# Patient Record
Sex: MALE | Race: WHITE
[De-identification: names, ages, dates, MRNs, and addresses within clinical notes are randomized per-mention and may not be internally consistent; named-entity substitution may affect disease eponyms.]

---

## 2019-07-22 ENCOUNTER — HOSPITAL ENCOUNTER (EMERGENCY)
Dept: HOSPITAL 60 - LB.ED | Age: 79
Discharge: HOME | End: 2019-07-22
Payer: MEDICARE

## 2019-07-22 DIAGNOSIS — Z79.899: ICD-10-CM

## 2019-07-22 DIAGNOSIS — I25.10: ICD-10-CM

## 2019-07-22 DIAGNOSIS — Z79.82: ICD-10-CM

## 2019-07-22 DIAGNOSIS — K80.20: Primary | ICD-10-CM

## 2019-07-22 PROCEDURE — 96361 HYDRATE IV INFUSION ADD-ON: CPT

## 2019-07-22 PROCEDURE — 99284 EMERGENCY DEPT VISIT MOD MDM: CPT

## 2019-07-22 PROCEDURE — 74176 CT ABD & PELVIS W/O CONTRAST: CPT

## 2019-07-22 PROCEDURE — 83690 ASSAY OF LIPASE: CPT

## 2019-07-22 PROCEDURE — 81001 URINALYSIS AUTO W/SCOPE: CPT

## 2019-07-22 PROCEDURE — 96374 THER/PROPH/DIAG INJ IV PUSH: CPT

## 2019-07-22 PROCEDURE — 85025 COMPLETE CBC W/AUTO DIFF WBC: CPT

## 2019-07-22 PROCEDURE — 36415 COLL VENOUS BLD VENIPUNCTURE: CPT

## 2019-07-22 PROCEDURE — 80053 COMPREHEN METABOLIC PANEL: CPT

## 2019-07-22 NOTE — EDM.PDOC
ED HPI GENERAL MEDICAL PROBLEM





- General


Chief Complaint: Abdominal Pain


Stated Complaint: RIGHT ABDOMINAL PAIN


Time Seen by Provider: 07/22/19 19:35


Source of Information: Reports: Patient, Family, RN


History Limitations: Reports: No Limitations





- History of Present Illness


INITIAL COMMENTS - FREE TEXT/NARRATIVE: 


This 79 yr male presents with right upper abdominal pain, started tonight 

around 1730.  Reports have [employer] for lunch and was walking dog this 

afternoon when pain started.  Rates pain 8/10.  States history of small 

gallstone in the past.  Hx of CAD and hernia in past.  States he does have 

loose stools and this isn't new for him and no diagnosis with this.  Wife 

states he takes Metoprolol and Crestor daily and Cilium husk to aide in BM.  





  ** Right Upper Anterior Abdomen


Pain Score (Numeric/FACES): 5





- Related Data


 Allergies











Allergy/AdvReac Type Severity Reaction Status Date / Time


 


No Known Allergies Allergy   Verified 12/09/13 09:16











Home Meds: 


 Home Meds





Ascorbate Calcium [Vitamin C] 500 mg PO 12/09/13 [History]


Aspirin [Adult Low Dose Aspirin EC] 81 mg PO DAILY 12/09/13 [History]


Metoprolol Succinate 12.5 mg PO BID 12/09/13 [History]


Nitroglycerin [Nitrostat] 0.3 mg SL PRN 12/09/13 [History]


Rosuvastatin [Crestor] 10 mg PO DAILY 12/09/13 [History]











ED ROS GENERAL





- Review of Systems


Review Of Systems: See Below


Constitutional: Reports: Decreased Appetite.  Denies: Fever, Chills


HEENT: Reports: No Symptoms


Respiratory: Denies: Shortness of Breath, Cough


Cardiovascular: Denies: Chest Pain, Edema, Lightheadedness


Endocrine: Reports: No Symptoms


GI/Abdominal: Reports: Abdominal Pain, Decreased Appetite, Other (loose stools)

.  Denies: Nausea, Vomiting


: Reports: No Symptoms


Musculoskeletal: Denies: Neck Pain, Back Pain


Skin: Reports: No Symptoms


Neurological: Reports: No Symptoms


Psychiatric: Reports: No Symptoms


Hematologic/Lymphatic: Reports: No Symptoms


Immunologic: Reports: No Symptoms





ED EXAM, GI/ABD





- Physical Exam


Exam: See Below


Exam Limited By: No Limitations


General Appearance: Alert, No Apparent Distress


Ears: Hearing Grossly Normal


Nose: Normal Inspection


Throat/Mouth: Normal Inspection, Normal Voice, No Airway Compromise


Head: Atraumatic, Normocephalic


Neck: Normal Inspection, Supple, Full Range of Motion


Respiratory/Chest: No Respiratory Distress, Lungs Clear, Normal Breath Sounds, 

Chest Non-Tender


Cardiovascular: Regular Rate, Rhythm, No Edema


GI/Abdominal Exam: Normal Bowel Sounds, Soft, No Distention, Other (pain to RUQ 

of abdomin).  No: Guarding, Rigid


 (Male) Exam: Deferred


Rectal (Males) Exam: Deferred


Back Exam: Normal Inspection, Full Range of Motion


Extremities: Normal Inspection, Normal Range of Motion, Non-Tender, No Pedal 

Edema


Neurological: Alert, Oriented, Normal Cognition


Psychiatric: Normal Affect, Normal Mood


Skin Exam: Warm, Dry, Normal Color


Lymphatic: No Adenopathy





Course





- Vital Signs


Last Recorded V/S: 


 Last Vital Signs











Temp  98.4 F   07/22/19 22:17


 


Pulse  80   07/22/19 22:17


 


Resp  17   07/22/19 22:17


 


BP  155/91 H  07/22/19 22:17


 


Pulse Ox  98   07/22/19 22:17














- Orders/Labs/Meds


Labs: 


 Laboratory Tests











  07/22/19 07/22/19 07/22/19 Range/Units





  19:35 19:35 19:36 


 


WBC  8.9    (4.0-11.0)  K/uL


 


RBC  5.19    (4.50-6.50)  M/uL


 


Hgb  15.8    (13.0-18.0)  g/dL


 


Hct  45.3    (40.0-54.0)  %


 


MCV  87    (76-96)  fL


 


MCH  30.4    (27.0-32.0)  pg


 


MCHC  34.9    (31.0-35.0)  g/dL


 


RDW  12.9    (11.0-16.0)  %


 


Plt Count  202    (150-400)  K/uL


 


MPV  9.5    (6.0-10.0)  fL


 


Neut % (Auto)  69.5    (45.0-70.0)  %


 


Lymph % (Auto)  20.4    (20.0-40.0)  %


 


Mono % (Auto)  7.5    (3.0-10.0)  %


 


Eos % (Auto)  2.2    (1.0-5.0)  %


 


Baso % (Auto)  0.4    (0.0-0.5)  %


 


Neut # (Auto)  6.17    (2.00-7.50)  K/uL


 


Lymph # (Auto)  1.81    (1.50-4.00)  K/uL


 


Mono # (Auto)  0.67    (0.20-0.80)  K/uL


 


Eos # (Auto)  0.20    (0.04-0.40)  K/uL


 


Baso # (Auto)  0.04    (0.02-0.10)  K/uL


 


Sodium     (136-145)  mmol/L


 


Potassium     (3.5-5.1)  mmol/L


 


Chloride     ()  mmol/L


 


Carbon Dioxide     (21.0-32.0)  mmol/L


 


Anion Gap     (5.0-15.0)  mmol/L


 


BUN     (8-26)  mg/dL


 


Creatinine     (0.70-1.30)  mg/dL


 


Est Cr Clr Drug Dosing     mL/min


 


Estimated GFR (MDRD)     (>60)  MLS/MIN


 


BUN/Creatinine Ratio     (6-25)  


 


Glucose     ()  mg/dL


 


Calcium     (8.5-10.1)  mg/dL


 


Total Bilirubin     (0.0-1.0)  mg/dL


 


AST     (15-37)  U/L


 


ALT     (12-78)  U/L


 


Alkaline Phosphatase     ()  U/L


 


Total Protein     (6.4-8.2)  g/dL


 


Albumin     (3.4-5.0)  g/dL


 


Globulin     (2.2-4.2)  g/dL


 


Albumin/Globulin Ratio     (0.8-2.0)  


 


Lipase   267  D   ()  U/L


 


Urine Color    Yellow  


 


Urine Appearance    Clear  (CLEAR)  


 


Urine pH    5.5  (5.0-8.0)  


 


Ur Specific Gravity    1.025  (1.003-1.030)  


 


Urine Protein    Negative  (NEGATIVE)  mg/dL


 


Urine Glucose (UA)    Negative  (NEGATIVE)  mg/dL


 


Urine Ketones    Trace H  (NEGATIVE)  mg/dL


 


Urine Occult Blood    Small H  (NEGATIVE)  


 


Urine Nitrite    Negative  (NEGATIVE)  


 


Urine Bilirubin    Negative  (NEGATIVE)  


 


Urine Urobilinogen    0.2  (0.2-1.0)  E.U./dL


 


Ur Leukocyte Esterase    Negative  (NEGATIVE)  


 


Urine RBC    0-5 H  /HPF


 


Urine WBC    0-5 H  /HPF














  07/22/19 Range/Units





  19:36 


 


WBC   (4.0-11.0)  K/uL


 


RBC   (4.50-6.50)  M/uL


 


Hgb   (13.0-18.0)  g/dL


 


Hct   (40.0-54.0)  %


 


MCV   (76-96)  fL


 


MCH   (27.0-32.0)  pg


 


MCHC   (31.0-35.0)  g/dL


 


RDW   (11.0-16.0)  %


 


Plt Count   (150-400)  K/uL


 


MPV   (6.0-10.0)  fL


 


Neut % (Auto)   (45.0-70.0)  %


 


Lymph % (Auto)   (20.0-40.0)  %


 


Mono % (Auto)   (3.0-10.0)  %


 


Eos % (Auto)   (1.0-5.0)  %


 


Baso % (Auto)   (0.0-0.5)  %


 


Neut # (Auto)   (2.00-7.50)  K/uL


 


Lymph # (Auto)   (1.50-4.00)  K/uL


 


Mono # (Auto)   (0.20-0.80)  K/uL


 


Eos # (Auto)   (0.04-0.40)  K/uL


 


Baso # (Auto)   (0.02-0.10)  K/uL


 


Sodium  143  (136-145)  mmol/L


 


Potassium  4.3  (3.5-5.1)  mmol/L


 


Chloride  106  ()  mmol/L


 


Carbon Dioxide  27.1  (21.0-32.0)  mmol/L


 


Anion Gap  14.2  (5.0-15.0)  mmol/L


 


BUN  26  (8-26)  mg/dL


 


Creatinine  1.01  (0.70-1.30)  mg/dL


 


Est Cr Clr Drug Dosing  70.08  mL/min


 


Estimated GFR (MDRD)  > 60  (>60)  MLS/MIN


 


BUN/Creatinine Ratio  25.7 H  (6-25)  


 


Glucose  105 H  ()  mg/dL


 


Calcium  8.6  (8.5-10.1)  mg/dL


 


Total Bilirubin  1.6 H  (0.0-1.0)  mg/dL


 


AST  91 H  (15-37)  U/L


 


ALT  63  (12-78)  U/L


 


Alkaline Phosphatase  78  ()  U/L


 


Total Protein  6.7  (6.4-8.2)  g/dL


 


Albumin  3.7  (3.4-5.0)  g/dL


 


Globulin  3.0  (2.2-4.2)  g/dL


 


Albumin/Globulin Ratio  1.2  (0.8-2.0)  


 


Lipase   ()  U/L


 


Urine Color   


 


Urine Appearance   (CLEAR)  


 


Urine pH   (5.0-8.0)  


 


Ur Specific Gravity   (1.003-1.030)  


 


Urine Protein   (NEGATIVE)  mg/dL


 


Urine Glucose (UA)   (NEGATIVE)  mg/dL


 


Urine Ketones   (NEGATIVE)  mg/dL


 


Urine Occult Blood   (NEGATIVE)  


 


Urine Nitrite   (NEGATIVE)  


 


Urine Bilirubin   (NEGATIVE)  


 


Urine Urobilinogen   (0.2-1.0)  E.U./dL


 


Ur Leukocyte Esterase   (NEGATIVE)  


 


Urine RBC   /HPF


 


Urine WBC   /HPF











Meds: 


Medications














Discontinued Medications














Generic Name Dose Route Start Last Admin





  Trade Name Freq  PRN Reason Stop Dose Admin


 


Hydrocodone Bitart/Acetaminophen  10 tab  07/22/19 19:30  





  Norco 325-5 Mg  .ROUTE  07/22/19 19:31  





  .STK-MED ONE   





     





     





     





     


 


Hydromorphone HCl  Confirm  07/22/19 20:40  07/22/19 20:49





  Dilaudid  Administered  07/22/19 20:41  Not Given





  Dose   





  2 mg   





  .ROUTE   





  .STK-MED ONE   





     





     





     





     


 


Hydromorphone HCl  0.5 mg  07/22/19 20:46  07/22/19 20:49





  Dilaudid  IVPUSH  07/22/19 20:47  0.5 mg





  ONETIME ONE   Administration





     





     





     





     


 


Sodium Chloride  1,000 mls @ 125 mls/hr  07/22/19 19:45  07/22/19 20:47





  Normal Saline  IV   125 mls/hr





  ASDIRECTED ABDIEL   Administration





     





     





     





     


 


Sodium Chloride  10 ml  07/22/19 19:39  





  Saline Flush  FLUSH   





  SEECOMMENT PRN   





  Keep Vein Open   





     





     





     














- Re-Assessments/Exams


Free Text/Narrative Re-Assessment/Exam: 





07/22/19 20:45


CMP, CBC, U/A and CT of abdomen completed with gallstone noted to gallbladder.  

Notified pt and family of gallstone.  Dilaudid 0.5 mg IV ordered, Nacl at 125cc/

hr ordered.  Will discharge to home if pain improved and taking oral fluids 

with consult with general surgery.











Departure





- Departure


Time of Disposition: 22:20


Disposition: Home, Self-Care 01


Condition: Fair


Clinical Impression: 


 Cholelithiasis, Abdominal pain








- Discharge Information


*PRESCRIPTION DRUG MONITORING PROGRAM REVIEWED*: Not Applicable


*COPY OF PRESCRIPTION DRUG MONITORING REPORT IN PATIENT LENY: Not Applicable


Instructions:  Cholelithiasis, Easy-to-Read


Referrals: 


PCP,None [Primary Care Provider] - 


Forms:  ED Department Discharge, ED Return to Work/School Form


Additional Instructions: 


Up as tolerated.  Limit stairs, frequent bending and limit strenuous activity.  

Follow a low fat diet until you meet with the [facility].  [facility] 

[facility] department will contact you in the next 2 days.  If they do not 

please contact them at [phone]. Please use the Hydrocodone 1 tab every 6 

hours as needed for pain, take food with this medication.  Ibuprofen 600mg may 

be taken as needed every 8 hours in addition to Hydrocodone.  Please take food 

with Ibuprofen also. Zofran prescription is sent with.  This medication will 

help with nausea. 





- Assessment/Plan


Plan: 


Cholelithiasis:


Discharge to home with assist of family.  Hydrocodone/APAP 5/325mg PO every 6 

hour prn pain, may use Ibuprofen 600 mg PO every 8 hour as needed, take 

medication with food.  Rx for Zofran 4mg PO prn given to be filled in am.  Will 

set up consult for general surgery at [facility].  Pt should hear from [facility] for 

appointment.  Activity restrictions as needed to prevent pain, low fat diet as 

tolerated.  Continue medications as at home.  RTC or ER if symptoms worsen.

## 2019-07-23 NOTE — CT
DATE OF SERVICE:  07/22/19

CLINICAL DATA:  RIGHT ABDOMINAL PAIN.



UNENHANCED ABDOMEN AND PELVIC CT: 



Multislice acquisition through the abdomen and pelvis without IV or oral 
contrast was performed.  Axial images and sagittal and coronal reformations are 
reviewed. 



Comparison is made to a prior enhanced abdomen and pelvic CT dated 12/23/13.



There are mild atelectatic changes in the dependent portion of both lungs and 
in both lung bases.  The heart size is normal.  There are moderate coronary 
artery calcifications. 



There is a large hiatal hernia containing most of the stomach.  



The unenhanced liver appears normal.  No focal hepatic lesions.  There is a 
densely calcified 13 mm gallstone noted within the gallbladder.  No gallbladder 
wall thickening.  No pericholecystic fluid.  



The spleen appears normal.  The pancreas appears normal.  The right and left 
adrenals appear normal.  



There is a 16 mm fluid density lesion projecting from the upper pole of the 
left kidney consistent with a renal cyst.  There are small parapelvic cysts 
bilaterally.  The kidneys otherwise appear normal.  No nephrocalcinosis or 
nephrolithiasis.  No hydronephrosis or hydroureter.  



The bladder is partially fluid filled.  It appears normal. 



The prostate is enlarged.  There are calcification within the prostate 
consistent with chronic prostatitis.  



No evidence of appendicitis.  



There is mild diverticulosis of the descending and sigmoid colon.  No evidence 
of diverticulitis.  



There is a ventral hernia on the left lateral to the rectus abdominis muscle.  
It does contain a loop of colon.  No evidence of obstruction associated with 
it.  



There is a right inguinal hernia containing a loop of small bowel.  No evidence 
of obstruction associated with it.  There is a fat-containing left inguinal 
hernia. 



No free air.  No free fluid.  No dilated loops of bowel.  No adenopathy.  No 
aortic aneurysm.  



There is degenerative disk disease throughout the lower thoracic and lumbar 
spine.  



IMPRESSION:  Cholelithiasis.  Multiple other findings as discussed above.  No 
acute abnormalities. 



[id number]
[facility]

## 2019-08-06 ENCOUNTER — HOSPITAL ENCOUNTER (EMERGENCY)
Dept: HOSPITAL 60 - LB.ED | Age: 79
LOS: 1 days | Discharge: HOME | End: 2019-08-07
Payer: MEDICARE

## 2019-08-06 DIAGNOSIS — R33.9: Primary | ICD-10-CM

## 2019-08-06 DIAGNOSIS — Z79.82: ICD-10-CM

## 2019-08-06 DIAGNOSIS — Z79.899: ICD-10-CM

## 2019-08-06 PROCEDURE — 99283 EMERGENCY DEPT VISIT LOW MDM: CPT

## 2019-08-06 PROCEDURE — 51798 US URINE CAPACITY MEASURE: CPT

## 2019-08-06 PROCEDURE — 51702 INSERT TEMP BLADDER CATH: CPT

## 2019-08-07 NOTE — EDM.PDOC
ED HPI GENERAL MEDICAL PROBLEM





- General


Chief Complaint: Genitourinary Problem


Stated Complaint: CANNOT PEE


Time Seen by Provider: 08/07/19 00:05


Source of Information: Reports: Patient


History Limitations: Reports: No Limitations





- History of Present Illness


INITIAL COMMENTS - FREE TEXT/NARRATIVE: 


79 yr male presents with retention of urination.  Pt had laproscopic surgery 

Monday and was discharged Monday night and has been unable to void Tuesday.  He 

hasn't had a BM yet.  He has some Hydrocodone/APAP at home.  States his last 

dose was around 1pm today.  States no history of prostate problems in the past, 

but does have some constipation history. He had been having a low fat diet.








- Related Data


 Allergies











Allergy/AdvReac Type Severity Reaction Status Date / Time


 


No Known Allergies Allergy   Verified 08/07/19 00:22











Home Meds: 


 Home Meds





Ascorbate Calcium [Vitamin C] 500 mg PO 12/09/13 [History]


Aspirin [Adult Low Dose Aspirin EC] 81 mg PO DAILY 12/09/13 [History]


Metoprolol Succinate 12.5 mg PO BID 12/09/13 [History]


Nitroglycerin [Nitrostat] 0.3 mg SL PRN 12/09/13 [History]


Rosuvastatin [Crestor] 10 mg PO DAILY 12/09/13 [History]











Past Medical History





- Past Health History


Medical/Surgical History: Denies Medical/Surgical History


Cardiovascular History: Reports: Stents





ED ROS GENERAL





- Review of Systems


Review Of Systems: See Below


Constitutional: Reports: No Symptoms


HEENT: Reports: Glasses


Respiratory: Reports: No Symptoms


Cardiovascular: Reports: No Symptoms


GI/Abdominal: Reports: Other (abdominal pain is rated at "8" with coughing, but 

none now.).  Denies: Diarrhea, Decreased Appetite


: Reports: Urinary Retention.  Denies: Dysuria, Hematuria


Skin: Reports: Other (dressings intact to abdomen)


Neurological: Reports: No Symptoms


Psychiatric: Reports: No Symptoms





ED EXAM, RENAL/





- Physical Exam


Exam: See Below


Exam Limited By: No Limitations


General Appearance: Alert, No Apparent Distress


Ears: Hearing Grossly Normal


Throat/Mouth: Normal Voice, No Airway Compromise


Head: Atraumatic, Normocephalic


Neck: Normal Inspection, Supple, Non-Tender


Respiratory/Chest: No Respiratory Distress, Lungs Clear, Normal Breath Sounds


Cardiovascular: Regular Rate, Rhythm, No Edema


GI/Abdominal: Soft, Non-Tender, No Distention


Extremities: Normal Inspection, Normal Range of Motion, Non-Tender, No Pedal 

Edema


Neurological: Alert, Oriented


Psychiatric: Normal Affect, Normal Mood


Skin Exam: Warm, Dry, Intact, Normal Color





Course





- Re-Assessments/Exams


Free Text/Narrative Re-Assessment/Exam: 





08/07/19 00:41 Pt tolerated urinary catheter without problems. Clear emperatriz 

urine noted.  Flomax 0.4 mg PO given.  Will discharge to care of family.  

Instructions on use of pain medicine, cough drops or lozenge for sore throat 

and warm salt water gargle as needed.








Departure





- Departure


Time of Disposition: 00:44


Disposition: Home, Self-Care 01


Condition: Good


Clinical Impression: 


 Retention of urine








- Discharge Information


*PRESCRIPTION DRUG MONITORING PROGRAM REVIEWED*: Not Applicable


*COPY OF PRESCRIPTION DRUG MONITORING REPORT IN PATIENT LENY: Not Applicable





- Assessment/Plan


Plan: 


Discharge to family care.  Continue with post op instructions.  Continue with 

pain medicine as needed.  Start Senna S to prevent constipation.  Continue with 

diet as tolerated.  Return to clinic in afternoon if unable to void.

## 2020-07-28 ENCOUNTER — HOSPITAL ENCOUNTER (EMERGENCY)
Dept: HOSPITAL 60 - LB.ED | Age: 80
Discharge: HOME | End: 2020-07-28
Payer: MEDICARE

## 2020-07-28 DIAGNOSIS — Z95.5: ICD-10-CM

## 2020-07-28 DIAGNOSIS — Z79.82: ICD-10-CM

## 2020-07-28 DIAGNOSIS — Z79.899: ICD-10-CM

## 2020-07-28 DIAGNOSIS — K40.90: Primary | ICD-10-CM

## 2020-07-28 PROCEDURE — 80053 COMPREHEN METABOLIC PANEL: CPT

## 2020-07-28 PROCEDURE — 99284 EMERGENCY DEPT VISIT MOD MDM: CPT

## 2020-07-28 PROCEDURE — 85025 COMPLETE CBC W/AUTO DIFF WBC: CPT

## 2020-07-28 PROCEDURE — 74177 CT ABD & PELVIS W/CONTRAST: CPT

## 2020-07-28 PROCEDURE — 36415 COLL VENOUS BLD VENIPUNCTURE: CPT

## 2020-07-28 PROCEDURE — 86140 C-REACTIVE PROTEIN: CPT

## 2020-07-28 PROCEDURE — 96374 THER/PROPH/DIAG INJ IV PUSH: CPT

## 2020-07-28 RX ADMIN — SODIUM CHLORIDE ONE ML: 9 INJECTION, SOLUTION INTRAMUSCULAR; INTRAVENOUS; SUBCUTANEOUS at 12:36

## 2020-07-28 RX ADMIN — SODIUM CHLORIDE ONE ML: 9 INJECTION, SOLUTION INTRAMUSCULAR; INTRAVENOUS; SUBCUTANEOUS at 12:29

## 2020-07-28 NOTE — EDM.PDOC
ED HPI GENERAL MEDICAL PROBLEM





- General


Chief Complaint: Abdominal Pain


Stated Complaint: ABD PAIN/POSSIBLE BOWEL OBSTRUCTION


Time Seen by Provider: 07/28/20 11:05


Source of Information: Reports: Patient, RN, RN Notes Reviewed


History Limitations: Reports: No Limitations





- History of Present Illness


INITIAL COMMENTS - FREE TEXT/NARRATIVE: 





Patient reports acute onset RLQ pain since last PM patient describes as dull, 

aching, and non-radiating.


Onset: Other (yesterday)


Onset Date: 07/27/20


Onset Time: 20:00


Duration: Day(s): (1)


Location: Reports: Abdomen (RLQ)


Front/Back Body Image: 


                            __________________________














                            __________________________





 1 - pain





Quality: Reports: Ache


Severity: Moderate


Improves with: Reports: None


Worsens with: Reports: None


Associated Symptoms: Denies: Confusion, Chest Pain, Cough, Fever/Chills, 

Headaches, Loss of Appetite, Malaise, Nausea/Vomiting, Rash, Shortness of 

Breath, Syncope, Weakness


  ** Right Lower Abdominal


Pain Score (Numeric/FACES): 6





- Related Data


                                    Allergies











Allergy/AdvReac Type Severity Reaction Status Date / Time


 


No Known Allergies Allergy   Verified 07/28/20 10:59











Home Meds: 


                                    Home Meds





Aspirin [Adult Low Dose Aspirin EC] 81 mg PO DAILY 12/09/13 [History]


Metoprolol Succinate 12.5 mg PO BID 12/09/13 [History]


Nitroglycerin [Nitrostat] 0.3 mg SL ASDIRECTED PRN 12/09/13 [History]


Rosuvastatin [Crestor] 10 mg PO DAILY 12/09/13 [History]











Past Medical History





- Past Health History


Medical/Surgical History: Denies Medical/Surgical History


Cardiovascular History: Reports: Stents





ED ROS GENERAL





- Review of Systems


Review Of Systems: See Below


Constitutional: Reports: No Symptoms


HEENT: Reports: No Symptoms


Respiratory: Reports: No Symptoms


Cardiovascular: Reports: No Symptoms


Endocrine: Reports: No Symptoms


GI/Abdominal: Reports: Abdominal Pain (RLQ).  Denies: Anorexia, Black Stool, 

Bloody Stool, Constipation, Diarrhea, Decreased Appetite, Difficulty Swallowing,

 Distension, Flatus, Hematemesis, Hematochezia, Melena, Mucous in Stool, Nausea,

 Stool Incontinence, Vomiting


Musculoskeletal: Reports: No Symptoms


Skin: Reports: No Symptoms


Neurological: Reports: No Symptoms





ED EXAM, GENERAL





- Physical Exam


Exam: See Below


Exam Limited By: No Limitations


General Appearance: Alert, WD/WN, No Apparent Distress


Ears: Normal External Exam, Normal Canal, Hearing Grossly Normal, Normal TMs


Nose: Normal Inspection, Normal Mucosa, No Blood


Throat/Mouth: Normal Inspection, Normal Lips, Normal Teeth, Normal Gums, Normal 

Oropharynx, Normal Voice, No Airway Compromise


Head: Atraumatic, Normocephalic


Neck: Normal Inspection, Supple, Non-Tender, Full Range of Motion


Respiratory/Chest: No Respiratory Distress, Lungs Clear, Normal Breath Sounds, 

No Accessory Muscle Use, Chest Non-Tender


Cardiovascular: Normal Peripheral Pulses, Regular Rate, Rhythm, No Edema, No 

Gallop, No JVD, No Murmur, No Rub


GI/Abdominal: Normal Bowel Sounds, Soft, No Organomegaly, No Distention, No 

Abnormal Bruit, No Mass, Pelvis Stable, Tender (RLQ).  No: Guarding, Rigid, 

Rebound, Abnormal Bowel Sounds, Mass, Hepatomegaly, Splenomegaly


Back Exam: Normal Inspection, Full Range of Motion.  No: CVA Tenderness (R), CVA

 Tenderness (L)


Extremities: Normal Inspection, Normal Range of Motion, Non-Tender, Normal 

Capillary Refill, No Pedal Edema


Neurological: Alert, Oriented, CN II-XII Intact, Normal Cognition, Normal Gait, 

Normal Reflexes, No Motor/Sensory Deficits


Skin Exam: Warm, Dry, Intact, Normal Color, No Rash


Lymphatic: No Adenopathy





Course





- Vital Signs


Last Recorded V/S: 


                                Last Vital Signs











Temp  98.3 F   07/28/20 13:04


 


Pulse  85   07/28/20 13:04


 


Resp  16   07/28/20 13:04


 


BP  159/86 H  07/28/20 13:04


 


Pulse Ox  98   07/28/20 13:04














- Orders/Labs/Meds


Orders: 


                               Active Orders 24 hr











 Category Date Time Status


 


 Abdomen Pelvis w Cont [CT] Stat Exams  07/28/20 11:18 Taken


 


 Diatrizoate Naya/Diatrizoate Na [Gastrografin 37%] Med  07/28/20 12:00 Active





 30 ml PO ASDIRECTED   


 


 Iodixanol [Visipaque 320] Med  07/28/20 13:00 Active





 100 ml IV ASDIRECTED   


 


 Sodium Chloride 0.9% [Saline Flush] Med  07/28/20 11:16 Active





 10 ml FLUSH ASDIRECTED PRN   


 


 Peripheral IV Insertion Adult [OM.PC] Routine Oth  07/28/20 11:16 Ordered








                                Medication Orders





Diatrizoate Meglum/Diatrizoate Sod (Gastrografin 37%)  30 ml PO ASDIRECTED ABDIEL


   Stop: 07/28/20 23:59


Iodixanol (Visipaque 320)  100 ml IV ASDIRECTED ABDIEL


   Stop: 07/28/20 23:59


Sodium Chloride (Saline Flush)  10 ml FLUSH ASDIRECTED PRN


   PRN Reason: Keep Vein Open


   Last Admin: 07/28/20 12:29  Dose: 10 ml


   Documented by: LEI








Labs: 


                                Laboratory Tests











  07/28/20 07/28/20 Range/Units





  11:17 11:17 


 


WBC  8.5   (4.0-11.0)  K/uL


 


RBC  5.32   (4.50-6.50)  M/uL


 


Hgb  16.6   (13.0-18.0)  g/dL


 


Hct  46.5   (40.0-54.0)  %


 


MCV  87   (76-96)  fL


 


MCH  31.2   (27.0-32.0)  pg


 


MCHC  35.7 H   (31.0-35.0)  g/dL


 


RDW  12.7   (11.0-16.0)  %


 


Plt Count  214   (150-400)  K/uL


 


MPV  9.6   (6.0-10.0)  fL


 


Neut % (Auto)  72.7 H   (45.0-70.0)  %


 


Lymph % (Auto)  19.0 L   (20.0-40.0)  %


 


Mono % (Auto)  6.8   (3.0-10.0)  %


 


Eos % (Auto)  1.3   (1.0-5.0)  %


 


Baso % (Auto)  0.2   (0.0-0.5)  %


 


Neut # (Auto)  6.15   (2.00-7.50)  K/uL


 


Lymph # (Auto)  1.61   (1.50-4.00)  K/uL


 


Mono # (Auto)  0.58   (0.20-0.80)  K/uL


 


Eos # (Auto)  0.11   (0.04-0.40)  K/uL


 


Baso # (Auto)  0.02   (0.02-0.10)  K/uL


 


Sodium   140  (136-145)  mmol/L


 


Potassium   3.8  (3.5-5.1)  mmol/L


 


Chloride   105  ()  mmol/L


 


Carbon Dioxide   27.0  (21.0-32.0)  mmol/L


 


Anion Gap   11.8  (5.0-15.0)  mmol/L


 


BUN   23  (8-26)  mg/dL


 


Creatinine   1.16  (0.70-1.30)  mg/dL


 


Est Cr Clr Drug Dosing   60.04  mL/min


 


Estimated GFR (MDRD)   > 60  (>60)  MLS/MIN


 


BUN/Creatinine Ratio   19.8  (6-25)  


 


Glucose   107 H  ()  mg/dL


 


Calcium   8.6  (8.5-10.1)  mg/dL


 


Total Bilirubin   1.8 H  (0.0-1.0)  mg/dL


 


AST   14 L  (15-37)  U/L


 


ALT   20  (12-78)  U/L


 


Alkaline Phosphatase   52  ()  U/L


 


C-Reactive Protein   3.1 H  (0.0-3.0)  mg/L


 


Total Protein   7.0  (6.4-8.2)  g/dL


 


Albumin   4.0  (3.4-5.0)  g/dL


 


Globulin   3.0  (2.2-4.2)  g/dL


 


Albumin/Globulin Ratio   1.3  (0.8-2.0)  











Meds: 


Medications











Generic Name Dose Route Start Last Admin





  Trade Name Freq  PRN Reason Stop Dose Admin


 


Diatrizoate Meglum/Diatrizoate Sod  30 ml  07/28/20 12:00 





  Gastrografin 37%  PO  07/28/20 23:59 





  ASDIRECTED ABDIEL  


 


Iodixanol  100 ml  07/28/20 13:00 





  Visipaque 320  IV  07/28/20 23:59 





  ASDIRECTED ABDIEL  


 


Sodium Chloride  10 ml  07/28/20 11:16  07/28/20 12:29





  Saline Flush  FLUSH   10 ml





  ASDIRECTED PRN   Administration





  Keep Vein Open  














Discontinued Medications














Generic Name Dose Route Start Last Admin





  Trade Name Freq  PRN Reason Stop Dose Admin


 


Iodixanol  100 ml  07/28/20 12:00  07/28/20 12:35





  Visipaque 320  IV   100 ml





  .AS DIRECTED ABDIEL   Administration


 


Ketorolac Tromethamine  15 mg  07/28/20 12:21  07/28/20 12:27





  Toradol  IVPUSH  07/28/20 12:22  15 mg





  ONETIME ONE   Administration


 


Ketorolac Tromethamine  Confirm  07/28/20 12:33  07/28/20 13:07





  Toradol  Administered  07/28/20 12:34  Not Given





  Dose  





  30 mg  





  .ROUTE  





  .STK-MED ONE  


 


Sodium Chloride  50 ml  07/28/20 11:49  07/28/20 12:36





  Normal Saline  FLUSH  07/28/20 11:50  50 ml





  ONETIME ONE   Administration














- Radiology Interpretation


CT Results Date: 07/28/20


CT Results Time: 13:20 (Right inguinal hernia without incarceration or abscess. 

O/W negative CT of A&P)





- Re-Assessments/Exams


Free Text/Narrative Re-Assessment/Exam: 





07/28/20 13:33


Pain resolved post Toradol





Departure





- Departure


Time of Disposition: 13:43


Disposition: Home, Self-Care 01


Condition: Good


Clinical Impression: 


 Inguinal hernia of right side without obstruction or gangrene








- Discharge Information


*PRESCRIPTION DRUG MONITORING PROGRAM REVIEWED*: Not Applicable


*COPY OF PRESCRIPTION DRUG MONITORING REPORT IN PATIENT LENY: Not Applicable


Instructions:  Hernia, Adult


Forms:  ED Department Discharge, ED Return to Work/School Form





Sepsis Event Note (ED)





- Evaluation


Sepsis Screening Result: No Definite Risk





- Focused Exam


Vital Signs: 


                                   Vital Signs











  Temp Pulse Resp BP Pulse Ox


 


 07/28/20 13:04  98.3 F  85  16  159/86 H  98


 


 07/28/20 11:00  98.7 F  79  18  188/100 H  97














- My Orders


Last 24 Hours: 


My Active Orders





07/28/20 11:16


Sodium Chloride 0.9% [Saline Flush]   10 ml FLUSH ASDIRECTED PRN 


Peripheral IV Insertion Adult [OM.PC] Routine 





07/28/20 11:18


Abdomen Pelvis w Cont [CT] Stat 





07/28/20 12:00


Diatrizoate Naya/Diatrizoate Na [Gastrografin 37%]   30 ml PO ASDIRECTED 





07/28/20 13:00


Iodixanol [Visipaque 320]   100 ml IV ASDIRECTED 














- Assessment/Plan


Last 24 Hours: 


My Active Orders





07/28/20 11:16


Sodium Chloride 0.9% [Saline Flush]   10 ml FLUSH ASDIRECTED PRN 


Peripheral IV Insertion Adult [OM.PC] Routine 





07/28/20 11:18


Abdomen Pelvis w Cont [CT] Stat 





07/28/20 12:00


Diatrizoate Naya/Diatrizoate Na [Gastrografin 37%]   30 ml PO ASDIRECTED 





07/28/20 13:00


Iodixanol [Visipaque 320]   100 ml IV ASDIRECTED 











Plan: 





DIFFERENTIAL DIAGNOSES:


Appendicits


Bowel Obstruction


Mesenteric Adenitis


Muscle Strain


Amongst Many Other





INITIAL PLAN:


(1) CBC, CMP, CRP


(2) CT A&P w/contrast





MDM:


Patient has moderate RLQ abdominal which is concerning for acute appendicitis. 

Will perform serologic evaluation and CT of A&P. Patient initially refused 

analgesics, but later requested and will be given a reduced dose of Toradol 

based on his age. Patient verbalized moderate relief from his Toradol and has 

consumed PO contrast without difficulty.

## 2020-07-29 NOTE — CT
DATE OF SERVICE:  07/28/20

CLINICAL DATA:  RLQ PAIN



ENHANCED ABDOMEN AND PELVIC CT:  



Multislice acquisition through the abdomen and pelvis with IV and oral contrast 
was performed. 



Comparison is made to a prior exam dated 07/22/19.



There are persistent atelectatic/fibrotic changes in both lung bases.



The heart is stable.  



There is a persistent large hiatal hernia, unchanged. 



The patient is status post cholecystectomy.  The liver, spleen, pancreas, and 
right and left adrenals are stable. 



The right and left kidneys are stable.  No hydronephrosis or hydroureter.  The 
bladder is fluid-filled.  It appears normal.  



The prostate remains enlarged, unchanged.  



There is a persistent right inguinal hernia containing a loop of small bowel.  
No evidence of obstruction associated with it.  There is a persistent ventral 
hernia lateral to the rectus abdominis muscle containing fat.  Descending colon 
does protrude into the hernia sac.  No evidence of obstruction associated with 
it.  



The remainder of the exam is unchanged from the prior. 



[id number]

[facility]

## 2021-02-25 ENCOUNTER — HOSPITAL ENCOUNTER (EMERGENCY)
Dept: HOSPITAL 60 - LB.ED | Age: 81
Discharge: NURSING, REHAB OR LONG-TERM CARE | End: 2021-02-25
Payer: MEDICARE

## 2021-02-25 VITALS — SYSTOLIC BLOOD PRESSURE: 118 MMHG | DIASTOLIC BLOOD PRESSURE: 66 MMHG

## 2021-02-25 VITALS — HEART RATE: 71 BPM

## 2021-02-25 DIAGNOSIS — Z95.5: ICD-10-CM

## 2021-02-25 DIAGNOSIS — R04.0: Primary | ICD-10-CM

## 2021-02-25 DIAGNOSIS — I25.10: ICD-10-CM

## 2021-02-25 DIAGNOSIS — I21.9: ICD-10-CM

## 2021-02-25 PROCEDURE — A0429 BLS-EMERGENCY: HCPCS

## 2021-02-25 PROCEDURE — A0425 GROUND MILEAGE: HCPCS

## 2021-02-25 RX ADMIN — NITROGLYCERIN ONE MG: 0.4 TABLET SUBLINGUAL at 03:25

## 2021-02-25 RX ADMIN — NITROGLYCERIN ONE MG: 0.4 TABLET SUBLINGUAL at 02:54

## 2021-02-25 RX ADMIN — NITROGLYCERIN ONE MG: 0.4 TABLET SUBLINGUAL at 03:12

## 2021-02-25 NOTE — CR
Date of Service:  02/25/21

Clinical Data:  chest pain



AP CHEST:  



Comparison is made to a prior exam dated 06/10/07.  



The heart size is normal.  



There is a large gas-containing mass posterior to the heart consistent with a 
large hiatal hernia.



There is mild soft tissue fullness in the superior mediastinum and it does 
appear to be widened compared to the prior exam.  I cannot exclude a mass or 
adenopathy.  Chest CT is recommended.  



There are mild interstitial changes throughout both lungs.  The lungs are 
otherwise clear.  No areas of consolidation.  



No pneumothorax.  No pleural effusions.  



[id number]

[facility]

## 2021-03-29 NOTE — EDM.PDOC
ED HPI GENERAL MEDICAL PROBLEM





- General


Chief Complaint: General


Stated Complaint: Severe Nosebleed


Time Seen by Provider: 02/25/21 02:20


Source of Information: Reports: Patient


History Limitations: Reports: No Limitations





- History of Present Illness


INITIAL COMMENTS - FREE TEXT/NARRATIVE: 





presented to the ER by EMS due to nasal bleed.. 





Patient reports that his BP has been on the higher side today, and noticed some 

nasal bleed that got worse.  He applied pressure on it, but didn't completely 

control it.





No SOB or dizziness. He called 911, upon arrival of the EMS, he started to c/o 

CO, EKG was done showed signs of ST elevation.





No weakness or dizziness.





H/o CAD in the past with stents placement.








Onset: Today


Duration: Hour(s): (6 nasal bleed)





- Related Data


                                    Allergies











Allergy/AdvReac Type Severity Reaction Status Date / Time


 


No Known Allergies Allergy   Verified 07/28/20 10:59











Home Meds: 


                                    Home Meds





Aspirin [Adult Low Dose Aspirin EC] 81 mg PO DAILY 12/09/13 [History]


Metoprolol Succinate 12.5 mg PO BID 12/09/13 [History]


Nitroglycerin [Nitrostat] 0.3 mg SL ASDIRECTED PRN 12/09/13 [History]


Rosuvastatin [Crestor] 10 mg PO DAILY 12/09/13 [History]











Past Medical History





- Past Health History


Medical/Surgical History: Denies Medical/Surgical History


Cardiovascular History: Reports: Stents


Gastrointestinal History: Reports: Cholelithiasis





- Past Surgical History


GI Surgical History: Reports: Hernia, Abdominal, Hernia, Inguinal





Social & Family History





- Caffeine Use


Caffeine Use: Reports: Tea





ED ROS GENERAL





- Review of Systems


Review Of Systems: See Below


Constitutional: Reports: No Symptoms


HEENT: Reports: Nosebleed


Respiratory: Reports: No Symptoms


Cardiovascular: Reports: Chest Pain


Endocrine: Reports: No Symptoms


GI/Abdominal: Reports: No Symptoms


: Reports: No Symptoms


Musculoskeletal: Reports: No Symptoms





ED EXAM, GENERAL





- Physical Exam


Exam: See Below


Exam Limited By: No Limitations


General Appearance: Alert, WD/WN, Anxious


Nose: Other (dried nasal blood. no active bleeding)


Respiratory/Chest: No Respiratory Distress, Lungs Clear


Cardiovascular: Normal Peripheral Pulses, Regular Rate, Rhythm, No Edema


GI/Abdominal: Normal Bowel Sounds, Soft, Non-Tender


Extremities: Normal Inspection


Neurological: Alert, Oriented, No Motor/Sensory Deficits


  ** #1 Interpretation


EKG Date: 02/25/21


Time: 02:25


Rhythm: NSR


Axis: Normal


P-Wave: Present


QRS: Normal


ST-T: Elevated


QT: Normal


Comparison: NA - No Prior EKG





Course





- Vital Signs


Last Recorded V/S: 


                                Last Vital Signs











Temp      


 


Pulse  71   02/25/21 02:48


 


Resp      


 


BP  118/66   02/25/21 03:25


 


Pulse Ox  98   02/25/21 02:48














- Orders/Labs/Meds


Orders: 


                                Medication Orders





Heparin Sodium/Dextrose (Heparin 25,000 Units In D5w 500 Ml)  25,000 units in 

500 mls @ 20.6 mls/hr IV TITRATE ABDIEL; Protocol


   Last Admin: 02/25/21 02:56  Dose: 1,000 units/hr, 20 mls/hr


   Documented by: DARIANA   Cosigned by: AKASH


Sodium Chloride (Sodium Chloride 0.9% 10 Ml Syringe)  10 ml FLUSH ASDIRECTED PRN


   PRN Reason: Keep Vein Open








Labs: 


                                Laboratory Tests











  02/25/21 02/25/21 02/25/21 Range/Units





  02:49 02:49 02:49 


 


WBC   16.0 H D   (4.0-11.0)  K/uL


 


RBC   4.48 L   (4.50-6.50)  M/uL


 


Hgb   13.6   (13.0-18.0)  g/dL


 


Hct   40.4   (40.0-54.0)  %


 


MCV   90   (76-96)  fL


 


MCH   30.4   (27.0-32.0)  pg


 


MCHC   33.7   (31.0-35.0)  g/dL


 


RDW   13.1   (11.0-16.0)  %


 


Plt Count   255   (150-400)  K/uL


 


MPV   9.7   (6.0-10.0)  fL


 


PT  11.4    (9.0-11.5)  sec


 


INR  1.1    (1.0-3.5)  


 


Sodium    141  (136-145)  mmol/L


 


Potassium    3.7  (3.5-5.1)  mmol/L


 


Chloride    106  ()  mmol/L


 


Carbon Dioxide    24.8  (21.0-32.0)  mmol/L


 


Anion Gap    13.9  (5.0-15.0)  mmol/L


 


BUN    24  (8-26)  mg/dL


 


Creatinine    1.32 H  (0.70-1.30)  mg/dL


 


Est Cr Clr Drug Dosing    TNP  


 


Estimated GFR (MDRD)    52 L  (>60)  MLS/MIN


 


BUN/Creatinine Ratio    18.2  (6-25)  


 


Glucose    165 H D  ()  mg/dL


 


Calcium    8.9  (8.5-10.1)  mg/dL


 


Troponin I    0.034  (0.000-0.060)  ng/mL











Meds: 


Medications











Generic Name Dose Route Start Last Admin





  Trade Name Freq  PRN Reason Stop Dose Admin


 


Heparin Sodium/Dextrose  25,000 units in 500 mls @ 20.6 mls/hr  02/25/21 02:45  

02/25/21 02:56





  Heparin 25,000 Units In D5w 500 Ml  IV   1,000 units/hr





  TITRATE ABDIEL   20 mls/hr





    Administration





  Protocol  


 


Sodium Chloride  10 ml  02/25/21 02:45 





  Sodium Chloride 0.9% 10 Ml Syringe  FLUSH  





  ASDIRECTED PRN  





  Keep Vein Open  














Discontinued Medications














Generic Name Dose Route Start Last Admin





  Trade Name Freq  PRN Reason Stop Dose Admin


 


Aspirin  324 mg  02/25/21 02:47  02/25/21 02:48





  Aspirin 81 Mg Tab.Chew  PO  02/25/21 02:48  324 mg





  ONETIME ONE   Administration


 


Clopidogrel Bisulfate  300 mg  02/25/21 02:45  02/25/21 03:21





  Clopidogrel 75 Mg Tab  PO  02/25/21 02:46  300 mg





  ONETIME ONE   Administration


 


Heparin Sodium (Porcine)  4,000 units  02/25/21 02:50  02/25/21 02:55





  Heparin Sodium 5,000 Units/Ml Vial  IVPUSH  02/25/21 02:51  4,000 units





  BOLUS ONE   Administration


 


Sodium Chloride  1,000 mls @ 500 mls/hr  02/25/21 03:00  02/25/21 02:45





  Normal Saline  IV   500 mls/hr





  ASDIRECTED ABDIEL   Administration


 


Morphine Sulfate  2 mg  02/25/21 02:47  02/25/21 03:02





  Morphine 2 Mg/Ml Syringe  IVPUSH  02/25/21 02:48  2 mg





  ONETIME ONE   Administration


 


Nitroglycerin  0.4 mg  02/25/21 02:45  02/25/21 03:25





  Nitroglycerin 0.4 Mg Tab.Sl  SL  02/25/21 02:46  0.4 mg





  ONETIME ONE   Administration














- Re-Assessments/Exams


Free Text/Narrative Re-Assessment/Exam: 











Upon arrival - was placed on a tele monitor.





IV line was established.





Bleeding was controlled with pressure. No more bleeding. 





Labs were ordered.





Called cardiologist on call at [facility]- recommended to start heparin and to 

avoid tPA at this time due to increase risk bleeding in the nose.





Chest pain was controlled with Nitro and IV opioids. Pain down from 8 to 1.





Heparin was started.





Blood pressure improved with controlling the pain.





Discussed with patient his clinical findings - agreed to be transferred by EMS 

to outside ER for higher level of cardiac care. 











Departure





- Departure


Time of Disposition: 03:30


Disposition: DC/Tfer to [facility] 02


Clinical Impression: 


 MI, Myocardial infarction, Epistaxis not due to trauma








- Discharge Information


Referrals: 


PCP,None [Primary Care Provider] - 


Forms:  ED Department Discharge





- Problem List & Annotations


(1) Epistaxis not due to trauma


SNOMED Code(s): 618966266


   Code(s): R04.0 - EPISTAXIS   Status: Acute   Priority: Medium   





(2) MI, Myocardial infarction


SNOMED Code(s): 77422248


   Code(s): I21.9 - ACUTE MYOCARDIAL INFARCTION, UNSPECIFIED   Status: Acute   

Priority: High   





- Problem List Review


Problem List Initiated/Reviewed/Updated: Yes





- Assessment/Plan


Plan: 





STEMI protocol was initiated and followed


Heparin IV bolus and drip


pain control


vitals control and monitoring


stabilization of nasal bleed


transfer to a higher level of care..